# Patient Record
Sex: FEMALE | Race: BLACK OR AFRICAN AMERICAN | NOT HISPANIC OR LATINO | Employment: FULL TIME | URBAN - METROPOLITAN AREA
[De-identification: names, ages, dates, MRNs, and addresses within clinical notes are randomized per-mention and may not be internally consistent; named-entity substitution may affect disease eponyms.]

---

## 2023-03-27 PROBLEM — N95.0 POSTMENOPAUSAL BLEEDING: Status: ACTIVE | Noted: 2023-03-27

## 2023-03-27 PROBLEM — I10 ESSENTIAL HYPERTENSION: Status: ACTIVE | Noted: 2023-03-27

## 2023-03-27 PROBLEM — D64.9 ANEMIA: Status: ACTIVE | Noted: 2023-03-27

## 2023-03-27 PROBLEM — R73.9 HYPERGLYCEMIA: Status: ACTIVE | Noted: 2023-03-27

## 2023-03-27 PROBLEM — J45.909 ASTHMA: Status: ACTIVE | Noted: 2023-03-27

## 2023-03-27 PROBLEM — E03.9 ACQUIRED HYPOTHYROIDISM: Status: ACTIVE | Noted: 2023-03-27

## 2023-03-28 ENCOUNTER — OFFICE VISIT (OUTPATIENT)
Dept: FAMILY MEDICINE CLINIC | Facility: CLINIC | Age: 63
End: 2023-03-28
Payer: COMMERCIAL

## 2023-03-28 VITALS
DIASTOLIC BLOOD PRESSURE: 86 MMHG | WEIGHT: 293 LBS | RESPIRATION RATE: 16 BRPM | OXYGEN SATURATION: 98 % | SYSTOLIC BLOOD PRESSURE: 129 MMHG | HEART RATE: 72 BPM | BODY MASS INDEX: 47.09 KG/M2 | TEMPERATURE: 98.7 F | HEIGHT: 66 IN

## 2023-03-28 DIAGNOSIS — M17.0 BILATERAL PRIMARY OSTEOARTHRITIS OF KNEE: ICD-10-CM

## 2023-03-28 DIAGNOSIS — Z12.11 SCREENING FOR COLON CANCER: ICD-10-CM

## 2023-03-28 DIAGNOSIS — E78.2 MIXED HYPERLIPIDEMIA: ICD-10-CM

## 2023-03-28 DIAGNOSIS — Z00.00 ANNUAL PHYSICAL EXAM: Primary | ICD-10-CM

## 2023-03-28 DIAGNOSIS — Z12.31 SCREENING MAMMOGRAM, ENCOUNTER FOR: ICD-10-CM

## 2023-03-28 DIAGNOSIS — E03.9 ACQUIRED HYPOTHYROIDISM: ICD-10-CM

## 2023-03-28 DIAGNOSIS — I10 ESSENTIAL HYPERTENSION: ICD-10-CM

## 2023-03-28 DIAGNOSIS — R73.9 HYPERGLYCEMIA: ICD-10-CM

## 2023-03-28 DIAGNOSIS — D50.8 OTHER IRON DEFICIENCY ANEMIA: ICD-10-CM

## 2023-03-28 DIAGNOSIS — B35.1 ONYCHOMYCOSIS OF TOENAIL: ICD-10-CM

## 2023-03-28 PROBLEM — E66.01 MORBID (SEVERE) OBESITY DUE TO EXCESS CALORIES: Status: ACTIVE | Noted: 2023-03-28

## 2023-03-28 RX ORDER — DICLOFENAC SODIUM 75 MG/1
1 TABLET, DELAYED RELEASE ORAL EVERY 12 HOURS SCHEDULED
COMMUNITY
Start: 2023-02-24

## 2023-03-28 RX ORDER — TERBINAFINE HYDROCHLORIDE 250 MG/1
250 TABLET ORAL DAILY
Qty: 90 TABLET | Refills: 0 | Status: SHIPPED | OUTPATIENT
Start: 2023-03-28

## 2023-03-28 RX ORDER — ALBUTEROL SULFATE 90 UG/1
2 AEROSOL, METERED RESPIRATORY (INHALATION) EVERY 4 HOURS PRN
COMMUNITY

## 2023-03-28 RX ORDER — AMLODIPINE BESYLATE 2.5 MG/1
1 TABLET ORAL DAILY
COMMUNITY
Start: 2023-02-24

## 2023-03-28 NOTE — PROGRESS NOTES
"Chief Complaint  Annual Exam    Subjective    {CC  Problem List  Visit  Diagnosis   Encounters  Notes  Medications  Labs  Result Review Imaging  Media :23}     Juliane Nagy presents to [facility] [place] [facility] for Annual Exam.    History of Present Illness   Annual Exam-Postmenopausal:     Juliane Nagy 62 y.o.female presents for annual exam.     Last pap: approximate date 3/3021 and was normal  Last mammogramapproximate date several years ago and can't stand long enough to do it.  and was normal and patient does not recall results of last mammogram  The patient is not taking hormone replacement therapy.   The patient has regular exercise: yes.     This patient has ever been tested for HepC: no .   history; colonoscopy/sigmoidoscopy: cologuard 3 years ago  Her immunization are not up-to-date.  Labs results were discussed and ordered  Exercising 6 days a week for 15 minutes.   Has lost 42# in the past 6 years.     Hypertension, hyperlipidemia and hyperglycemia.  Takes prn albuterol for asthma and diclofenac for arthritis and will also use Tylenol.  Working on weight loss to be eligible for knee replacement with Dr. Lizarraga and now willing to do it.     She also has end stage osteoarthritis of her knees and needs a replacement but has to lose weight first. Not interested in medication or weight loss surgery.     Review of Systems   Constitutional:        Weight loss   Musculoskeletal: Positive for gait problem.   Skin: Positive for color change.        Left breast        Objective       Vital Signs:   /86   Pulse 72   Temp 98.7 °F (37.1 °C) (Oral)   Resp 16   Ht 166.4 cm (65.5\")   Wt (!) 155 kg (341 lb 11.2 oz)   SpO2 98%   BMI 56.00 kg/m²     Body mass index is 56 kg/m².       PHQ-9 Total Score: 0     Physical Exam  Constitutional:       General: She is not in acute distress.     Appearance: Normal appearance.   HENT:      Head: Normocephalic and atraumatic.      " Nose: Nose normal.   Eyes:      Conjunctiva/sclera: Conjunctivae normal.      Pupils: Pupils are equal, round, and reactive to light.   Cardiovascular:      Rate and Rhythm: Normal rate and regular rhythm.      Heart sounds: Normal heart sounds.   Pulmonary:      Effort: Pulmonary effort is normal.      Breath sounds: Normal breath sounds.   Abdominal:      General: Bowel sounds are normal.      Palpations: Abdomen is soft.   Musculoskeletal:         General: Deformity present.      Cervical back: Neck supple.      Comments: Decreased flexion in both knees, right to 90 and left to 110    Bilateral valgus deformity of knees   Skin:     General: Skin is warm.      Comments: Left breast with 5mm granuloma in the skin from old injury   Neurological:      General: No focal deficit present.      Mental Status: She is alert.      Gait: Gait abnormal.   Psychiatric:         Behavior: Behavior normal.          Result Review  Data Reviewed:{ Labs  Result Review  Imaging  Med Tab  Media :23}               Discussed healthy diet, exercise, adequate sleep, cancer screening, immunizations and preventative care. Annual eye exam and routine dental cleaning encouraged.        Assessment and Plan {CC Problem List  Visit Diagnosis  ROS  Review (Popup)  Health Maintenance  Quality  BestPractice  Medications  SmartSets  SnapShot Encounters  Media :23}   Diagnoses and all orders for this visit:    1. Annual physical exam (Primary)    2. Hyperglycemia  -     Hemoglobin A1c    3. Essential hypertension  -     Comprehensive Metabolic Panel  -     CBC & Differential    4. Acquired hypothyroidism  -     TSH    5. Screening mammogram, encounter for  -     Mammo Screening Digital Tomosynthesis Bilateral With CAD; Future    6. Screening for colon cancer  -     Cologuard - Stool, Per Rectum; Future    7. Bilateral primary osteoarthritis of knee  Assessment & Plan:  She is continuing to work on weight loss but would like to  consider a second opinion if the location is convenient.     Orders:  -     Ambulatory Referral to Orthopedic Surgery    8. Mixed hyperlipidemia  -     Comprehensive Metabolic Panel  -     Lipid Panel    9. Other iron deficiency anemia  Assessment & Plan:  On daily iron.      10. Onychomycosis of toenail  -     terbinafine (LamISIL) 250 MG tablet; Take 1 tablet by mouth Daily.  Dispense: 90 tablet; Refill: 0      Patient Instructions   I have ordered lab tests today.  You should receive a phone call or a [employer] message with those results.  If you have not heard from us in 7-10 days, please call the office.      You are eligible for a covid booster, Pneumonia vaccine, shingles vaccine and a tetanus booster.          No follow-ups on file.    Marian Herrera MD

## 2023-03-28 NOTE — PATIENT INSTRUCTIONS
I have ordered lab tests today.  You should receive a phone call or a [employer] message with those results.  If you have not heard from us in 7-10 days, please call the office.      You are eligible for a covid booster, Pneumonia vaccine, shingles vaccine and a tetanus booster.

## 2023-03-29 LAB
ALBUMIN SERPL-MCNC: 4.2 G/DL (ref 3.8–4.9)
ALBUMIN/GLOB SERPL: 1.5 {RATIO} (ref 1.2–2.2)
ALP SERPL-CCNC: 106 IU/L (ref 44–121)
ALT SERPL-CCNC: 14 IU/L (ref 0–32)
AST SERPL-CCNC: 23 IU/L (ref 0–40)
BASOPHILS # BLD AUTO: 0 X10E3/UL (ref 0–0.2)
BASOPHILS NFR BLD AUTO: 1 %
BILIRUB SERPL-MCNC: 0.4 MG/DL (ref 0–1.2)
BUN SERPL-MCNC: 19 MG/DL (ref 8–27)
BUN/CREAT SERPL: 18 (ref 12–28)
CALCIUM SERPL-MCNC: 9.4 MG/DL (ref 8.7–10.3)
CHLORIDE SERPL-SCNC: 108 MMOL/L (ref 96–106)
CHOLEST SERPL-MCNC: 229 MG/DL (ref 100–199)
CO2 SERPL-SCNC: 19 MMOL/L (ref 20–29)
CREAT SERPL-MCNC: 1.03 MG/DL (ref 0.57–1)
EGFRCR SERPLBLD CKD-EPI 2021: 62 ML/MIN/1.73
EOSINOPHIL # BLD AUTO: 0.1 X10E3/UL (ref 0–0.4)
EOSINOPHIL NFR BLD AUTO: 2 %
ERYTHROCYTE [DISTWIDTH] IN BLOOD BY AUTOMATED COUNT: 14.3 % (ref 11.7–15.4)
GLOBULIN SER CALC-MCNC: 2.8 G/DL (ref 1.5–4.5)
GLUCOSE SERPL-MCNC: 97 MG/DL (ref 70–99)
HBA1C MFR BLD: 6 % (ref 4.8–5.6)
HCT VFR BLD AUTO: 38.6 % (ref 34–46.6)
HDLC SERPL-MCNC: 70 MG/DL
HGB BLD-MCNC: 12.2 G/DL (ref 11.1–15.9)
IMM GRANULOCYTES # BLD AUTO: 0 X10E3/UL (ref 0–0.1)
IMM GRANULOCYTES NFR BLD AUTO: 0 %
LDLC SERPL CALC-MCNC: 146 MG/DL (ref 0–99)
LYMPHOCYTES # BLD AUTO: 1.7 X10E3/UL (ref 0.7–3.1)
LYMPHOCYTES NFR BLD AUTO: 47 %
MCH RBC QN AUTO: 23.6 PG (ref 26.6–33)
MCHC RBC AUTO-ENTMCNC: 31.6 G/DL (ref 31.5–35.7)
MCV RBC AUTO: 75 FL (ref 79–97)
MONOCYTES # BLD AUTO: 0.4 X10E3/UL (ref 0.1–0.9)
MONOCYTES NFR BLD AUTO: 11 %
NEUTROPHILS # BLD AUTO: 1.4 X10E3/UL (ref 1.4–7)
NEUTROPHILS NFR BLD AUTO: 39 %
PLATELET # BLD AUTO: 290 X10E3/UL (ref 150–450)
POTASSIUM SERPL-SCNC: 5 MMOL/L (ref 3.5–5.2)
PROT SERPL-MCNC: 7 G/DL (ref 6–8.5)
RBC # BLD AUTO: 5.16 X10E6/UL (ref 3.77–5.28)
SODIUM SERPL-SCNC: 145 MMOL/L (ref 134–144)
TRIGL SERPL-MCNC: 73 MG/DL (ref 0–149)
TSH SERPL DL<=0.005 MIU/L-ACNC: 5.72 UIU/ML (ref 0.45–4.5)
VLDLC SERPL CALC-MCNC: 13 MG/DL (ref 5–40)
WBC # BLD AUTO: 3.7 X10E3/UL (ref 3.4–10.8)

## 2023-03-29 NOTE — ASSESSMENT & PLAN NOTE
She is exercising almost daily and has lost close to 50# in the last 6 years.  She is not interested in medications for weight loss.

## 2023-03-29 NOTE — ASSESSMENT & PLAN NOTE
She is continuing to work on weight loss but would like to consider a second opinion if the location is convenient.

## 2023-04-27 RX ORDER — AMLODIPINE BESYLATE 2.5 MG/1
TABLET ORAL
Qty: 30 TABLET | Refills: 0 | Status: SHIPPED | OUTPATIENT
Start: 2023-04-27

## 2023-04-27 NOTE — TELEPHONE ENCOUNTER
Rx Refill Note  Requested Prescriptions     Pending Prescriptions Disp Refills   • amLODIPine (NORVASC) 2.5 MG tablet [Pharmacy Med Name: amLODIPine Besylate 2.5 MG Oral Tablet] 30 tablet 0     Sig: Take 1 tablet by mouth once daily      Last office visit with prescribing clinician: 3/28/2023   Next office visit with prescribing clinician: Visit date not found     Pedro Christensen CMA  04/27/23, 11:21 EDT

## 2023-06-06 ENCOUNTER — HOSPITAL ENCOUNTER (EMERGENCY)
Facility: HOSPITAL | Age: 63
Discharge: HOME | End: 2023-06-06
Attending: EMERGENCY MEDICINE | Admitting: EMERGENCY MEDICINE
Payer: COMMERCIAL

## 2023-06-06 ENCOUNTER — APPOINTMENT (OUTPATIENT)
Dept: CT IMAGING | Facility: HOSPITAL | Age: 63
End: 2023-06-06
Payer: COMMERCIAL

## 2023-06-06 VITALS
HEART RATE: 85 BPM | RESPIRATION RATE: 17 BRPM | TEMPERATURE: 98.1 F | BODY MASS INDEX: 48.82 KG/M2 | WEIGHT: 293 LBS | OXYGEN SATURATION: 98 % | SYSTOLIC BLOOD PRESSURE: 153 MMHG | DIASTOLIC BLOOD PRESSURE: 85 MMHG | HEIGHT: 65 IN

## 2023-06-06 DIAGNOSIS — R42 VERTIGO: ICD-10-CM

## 2023-06-06 DIAGNOSIS — R42 DIZZINESS: Primary | ICD-10-CM

## 2023-06-06 LAB
ANION GAP SERPL CALCULATED.3IONS-SCNC: 8 MMOL/L (ref 5–15)
BASOPHILS # BLD AUTO: 0.01 10*3/MM3 (ref 0–0.2)
BASOPHILS NFR BLD AUTO: 0.3 % (ref 0–1.5)
BUN SERPL-MCNC: 20 MG/DL (ref 8–23)
BUN/CREAT SERPL: 21.5 (ref 7–25)
CALCIUM SPEC-SCNC: 9.6 MG/DL (ref 8.6–10.5)
CHLORIDE SERPL-SCNC: 105 MMOL/L (ref 98–107)
CO2 SERPL-SCNC: 27 MMOL/L (ref 22–29)
CREAT SERPL-MCNC: 0.93 MG/DL (ref 0.57–1)
DEPRECATED RDW RBC AUTO: 42.7 FL (ref 37–54)
EGFRCR SERPLBLD CKD-EPI 2021: 70.5 ML/MIN/1.73
EOSINOPHIL # BLD AUTO: 0.08 10*3/MM3 (ref 0–0.4)
EOSINOPHIL NFR BLD AUTO: 2.4 % (ref 0.3–6.2)
ERYTHROCYTE [DISTWIDTH] IN BLOOD BY AUTOMATED COUNT: 15.4 % (ref 12.3–15.4)
GLUCOSE SERPL-MCNC: 123 MG/DL (ref 65–99)
HCT VFR BLD AUTO: 37.4 % (ref 34–46.6)
HGB BLD-MCNC: 11.7 G/DL (ref 12–15.9)
IMM GRANULOCYTES # BLD AUTO: 0.01 10*3/MM3 (ref 0–0.05)
IMM GRANULOCYTES NFR BLD AUTO: 0.3 % (ref 0–0.5)
LYMPHOCYTES # BLD AUTO: 1.27 10*3/MM3 (ref 0.7–3.1)
LYMPHOCYTES NFR BLD AUTO: 38.1 % (ref 19.6–45.3)
MCH RBC QN AUTO: 23.9 PG (ref 26.6–33)
MCHC RBC AUTO-ENTMCNC: 31.3 G/DL (ref 31.5–35.7)
MCV RBC AUTO: 76.5 FL (ref 79–97)
MONOCYTES # BLD AUTO: 0.41 10*3/MM3 (ref 0.1–0.9)
MONOCYTES NFR BLD AUTO: 12.3 % (ref 5–12)
NEUTROPHILS NFR BLD AUTO: 1.55 10*3/MM3 (ref 1.7–7)
NEUTROPHILS NFR BLD AUTO: 46.6 % (ref 42.7–76)
PLATELET # BLD AUTO: 273 10*3/MM3 (ref 140–450)
PMV BLD AUTO: 9.7 FL (ref 6–12)
POTASSIUM SERPL-SCNC: 4 MMOL/L (ref 3.5–5.2)
RBC # BLD AUTO: 4.89 10*6/MM3 (ref 3.77–5.28)
SODIUM SERPL-SCNC: 140 MMOL/L (ref 136–145)
WBC NRBC COR # BLD: 3.33 10*3/MM3 (ref 3.4–10.8)

## 2023-06-06 PROCEDURE — 70450 CT HEAD/BRAIN W/O DYE: CPT

## 2023-06-06 PROCEDURE — 99282 EMERGENCY DEPT VISIT SF MDM: CPT

## 2023-06-06 PROCEDURE — 85025 COMPLETE CBC W/AUTO DIFF WBC: CPT | Performed by: EMERGENCY MEDICINE

## 2023-06-06 PROCEDURE — 80048 BASIC METABOLIC PNL TOTAL CA: CPT | Performed by: EMERGENCY MEDICINE

## 2023-06-06 PROCEDURE — 36415 COLL VENOUS BLD VENIPUNCTURE: CPT

## 2023-06-06 RX ORDER — MECLIZINE HYDROCHLORIDE 25 MG/1
25 TABLET ORAL 3 TIMES DAILY PRN
Qty: 14 TABLET | Refills: 0 | Status: SHIPPED | OUTPATIENT
Start: 2023-06-06 | End: 2023-06-20

## 2023-06-06 NOTE — FSED PROVIDER NOTE
Subjective   History of Present Illness  Patient has had several episodes where she turns over in bed and becomes extremely dizzy, she says she opens her eyes and sees the room spinning around, she said in the past she had had some episodes where she felt lightheaded after exercising but this is different.  No chest pain or shortness of breath no nausea vomiting or diarrhea.  She said she has been anemic all her life and takes iron pills as well    Review of Systems   Constitutional:  Negative for chills and diaphoresis.   HENT:  Negative for congestion, drooling and ear discharge.    Eyes:  Negative for pain, discharge and itching.   Respiratory:  Negative for apnea, choking and chest tightness.    Cardiovascular:  Negative for chest pain and leg swelling.   Gastrointestinal:  Negative for abdominal distention, abdominal pain and anal bleeding.   Endocrine: Negative for cold intolerance, heat intolerance and polydipsia.   Genitourinary:  Negative for difficulty urinating, dyspareunia and dysuria.   Musculoskeletal:  Negative for arthralgias, back pain and gait problem.   Skin:  Negative for color change, pallor and rash.   Allergic/Immunologic: Negative for environmental allergies and food allergies.   Neurological:  Negative for dizziness, facial asymmetry and headaches.   Hematological:  Negative for adenopathy. Does not bruise/bleed easily.   Psychiatric/Behavioral:  Negative for agitation, behavioral problems, confusion and decreased concentration.    All other systems reviewed and are negative.    Past Medical History:   Diagnosis Date    Anemia     Asthma     Essential (primary) hypertension     Hyperglycemia     Hypothyroidism     Knee pain, left     Lichen sclerosus     Menorrhagia     Pain in unspecified knee     Postmenopausal bleeding     Snoring        Allergies   Allergen Reactions    Penicillins Hives       History reviewed. No pertinent surgical history.    Family History   Problem Relation Age of  Onset    Coronary artery disease Sister     Diabetes Sister        Social History     Socioeconomic History    Marital status: Single   Tobacco Use    Smoking status: Never    Smokeless tobacco: Never   Vaping Use    Vaping Use: Never used   Substance and Sexual Activity    Alcohol use: Never    Drug use: Never    Sexual activity: Defer           Objective   Physical Exam  Vitals and nursing note reviewed.   Constitutional:       General: She is not in acute distress.     Appearance: Normal appearance.   HENT:      Head: Normocephalic and atraumatic.      Nose: Nose normal.      Mouth/Throat:      Mouth: Mucous membranes are moist.   Eyes:      Extraocular Movements: Extraocular movements intact.      Pupils: Pupils are equal, round, and reactive to light.   Cardiovascular:      Rate and Rhythm: Normal rate and regular rhythm.   Pulmonary:      Effort: Pulmonary effort is normal.      Breath sounds: Normal breath sounds.   Abdominal:      General: Abdomen is flat.      Palpations: Abdomen is soft.   Musculoskeletal:      Cervical back: Normal range of motion and neck supple.   Skin:     General: Skin is warm and dry.   Neurological:      General: No focal deficit present.      Mental Status: She is alert and oriented to person, place, and time.   Psychiatric:         Mood and Affect: Mood normal.         Behavior: Behavior normal.       Procedures           ED Course  ED Course as of 06/06/23 1221   [facility]   [facility]18 Discussed all results with patient and she understands she may need to follow-up with ENT [JW]      ED Course User Index  [JW] Jericho Sinha MD                                           Medical Decision Making  Patient with transient dizziness, normal neuro exam and no nystagmus but concern for vascular cerebral causes of this so we will do a head CT, also check to make sure she does not have symptomatic anemia.  We will reassess after these results are returned and we will talk about different  options assuming these are all normal and that it is positional vertigo    Problems Addressed:  Dizziness: complicated acute illness or injury  Vertigo: complicated acute illness or injury    Amount and/or Complexity of Data Reviewed  External Data Reviewed: notes.  Labs: ordered.  Radiology: ordered.        Final diagnoses:   Dizziness   Vertigo       ED Disposition  ED Disposition       ED Disposition   Discharge    Condition   Stable    Comment   --               Marian Herrera MD  [address] [id number] [phone]    Schedule an appointment as soon as possible for a visit            Medication List        New Prescriptions      meclizine 25 MG tablet  Commonly known as: ANTIVERT  Take 1 tablet by mouth 3 (Three) Times a Day As Needed for Dizziness for up to 14 days.               Where to Get Your Medications        These medications were sent to [facility] [address] [place], [place] - [address] [facility] - [phone]  - [phone]   [address] [facility] [address]      Phone: [phone]   meclizine 25 MG tablet

## 2024-05-15 ENCOUNTER — PATIENT MESSAGE (OUTPATIENT)
Dept: FAMILY MEDICINE CLINIC | Facility: CLINIC | Age: 64
End: 2024-05-15
Payer: COMMERCIAL

## 2024-08-20 ENCOUNTER — OFFICE VISIT (OUTPATIENT)
Dept: FAMILY MEDICINE CLINIC | Facility: CLINIC | Age: 64
End: 2024-08-20
Payer: COMMERCIAL

## 2024-08-20 VITALS
OXYGEN SATURATION: 99 % | HEART RATE: 75 BPM | DIASTOLIC BLOOD PRESSURE: 82 MMHG | BODY MASS INDEX: 48.82 KG/M2 | RESPIRATION RATE: 16 BRPM | SYSTOLIC BLOOD PRESSURE: 134 MMHG | HEIGHT: 65 IN | WEIGHT: 293 LBS

## 2024-08-20 DIAGNOSIS — E03.9 ACQUIRED HYPOTHYROIDISM: ICD-10-CM

## 2024-08-20 DIAGNOSIS — G89.29 CHRONIC RIGHT SHOULDER PAIN: ICD-10-CM

## 2024-08-20 DIAGNOSIS — I10 ESSENTIAL HYPERTENSION: ICD-10-CM

## 2024-08-20 DIAGNOSIS — E66.01 MORBID (SEVERE) OBESITY DUE TO EXCESS CALORIES: ICD-10-CM

## 2024-08-20 DIAGNOSIS — R73.9 HYPERGLYCEMIA: ICD-10-CM

## 2024-08-20 DIAGNOSIS — Z00.00 ANNUAL PHYSICAL EXAM: Primary | ICD-10-CM

## 2024-08-20 DIAGNOSIS — M17.0 BILATERAL PRIMARY OSTEOARTHRITIS OF KNEE: ICD-10-CM

## 2024-08-20 DIAGNOSIS — J45.30 MILD PERSISTENT ASTHMA WITHOUT COMPLICATION: ICD-10-CM

## 2024-08-20 DIAGNOSIS — Z11.59 ENCOUNTER FOR HEPATITIS C SCREENING TEST FOR LOW RISK PATIENT: ICD-10-CM

## 2024-08-20 DIAGNOSIS — M25.511 CHRONIC RIGHT SHOULDER PAIN: ICD-10-CM

## 2024-08-20 DIAGNOSIS — Z13.220 NEED FOR LIPID SCREENING: ICD-10-CM

## 2024-08-20 PROCEDURE — 99214 OFFICE O/P EST MOD 30 MIN: CPT | Performed by: FAMILY MEDICINE

## 2024-08-20 PROCEDURE — 99396 PREV VISIT EST AGE 40-64: CPT | Performed by: FAMILY MEDICINE

## 2024-08-20 RX ORDER — ALBUTEROL SULFATE 90 UG/1
2 AEROSOL, METERED RESPIRATORY (INHALATION) EVERY 4 HOURS PRN
Qty: 24 G | Refills: 1 | Status: SHIPPED | OUTPATIENT
Start: 2024-08-20

## 2024-08-20 NOTE — PROGRESS NOTES
"Chief Complaint  Annual Exam (With pap)    Subjective    {CC  Problem List  Visit  Diagnosis   Encounters  Notes  Medications  Labs  Result Review Imaging  Media :23}     Juliane Nagy [place] [facility] [place] for Annual Exam (With pap).    History of Present Illness     She's having difficulty doing her weight training with her right arm and she's having a lot of pain from that.  Topical treatment helps.  She doesn't want to do physical therapy.  Dr. Lizarraga sees her for her knees and wants her to take the weight loss shots but she doesn't want those, she's losing weight on her own.  She's would like to get another opinion.     She still can't stand  long enough for a mammogram.    She's been working on weight loss and was 383 in 3/15/2017 and this year has lost another 25.   She's exercising more and eating more fruits and vegetables.  She's los about 70# in the past 7 years and been consistent.     She really doesn't want to do another pap.  She's not sexually active.    Hypertension controlled by exercise and weight loss.    Hyperglycemia working on weight loss with last A1C of 6 and has continued to lose.    She did a cologuard in 2020 and isn't doing another.    Review of Systems     Objective       Vital Signs:   /82   Pulse 75   Resp 16   Ht 165.1 cm (65\")   Wt (!) 143 kg (315 lb 6.4 oz)   SpO2 99%   BMI 52.49 kg/m²     Body mass index is 52.49 kg/m².       PHQ-9 Total Score: 0     Physical Exam  Constitutional:       General: She is not in acute distress.     Appearance: Normal appearance.   HENT:      Head: Normocephalic and atraumatic.      Nose: Nose normal.   Eyes:      Conjunctiva/sclera: Conjunctivae normal.      Pupils: Pupils are equal, round, and reactive to light.   Cardiovascular:      Rate and Rhythm: Normal rate and regular rhythm.      Heart sounds: Normal heart sounds.   Pulmonary:      Effort: Pulmonary effort is normal.      Breath sounds: " Normal breath sounds.   Abdominal:      General: Bowel sounds are normal.      Palpations: Abdomen is soft.   Musculoskeletal:         General: Deformity (can not fully staighten her legs and walks squatted) present.      Cervical back: Neck supple.   Skin:     General: Skin is warm.   Neurological:      General: No focal deficit present.      Mental Status: She is alert.      Gait: Gait normal.   Psychiatric:         Behavior: Behavior normal.          Result Review  Data Reviewed:{ Labs  Result Review  Imaging  Med Tab  Media :23}               Discussed healthy diet, exercise, adequate sleep, cancer screening, immunizations and preventative care. Annual eye exam and routine dental cleaning encouraged.        Assessment and Plan {CC Problem List  Visit Diagnosis  ROS  Review (Popup)  [facility] Maintenance  Quality  BestPractice  Medications  SmartSets  SnapShot Encounters  Media :23}   Diagnoses and all orders for this visit:    1. Annual physical exam (Primary)    2. Encounter for hepatitis C screening test for low risk patient  -     Hepatitis C Antibody    3. Bilateral primary osteoarthritis of knee  -     Ambulatory Referral to Orthopedic Surgery    4. Chronic right shoulder pain  -     Ambulatory Referral to Orthopedic Surgery    5. Hyperglycemia  -     Hemoglobin A1c    6. Morbid (severe) obesity due to excess calories    7. Acquired hypothyroidism  Assessment & Plan:  No longer needing medication    Orders:  -     TSH    8. Essential hypertension  Assessment & Plan:  Controlled with diet and exercise    Orders:  -     CBC & Differential    9. Mild persistent asthma without complication  -     albuterol sulfate  (90 Base) MCG/ACT inhaler; Inhale 2 puffs Every 4 (Four) Hours As Needed for Wheezing.  Dispense: 24 g; Refill: 1    10. Need for lipid screening  -     Comprehensive Metabolic Panel  -     Lipid Panel      Class 3 Severe Obesity (BMI >=40). Obesity-related health conditions  include the following: osteoarthritis. Obesity is improving with lifestyle modifications. BMI is is above average; BMI management plan is completed. We discussed portion control and increasing exercise.       Patient Instructions   We agreed on a pap in 2026 and to be done then.      Keep up the good work with your weight loss, you have making good progress.     We talked about your right shoulder and physical therapy or an orthopedic consult is an option if and when you're ready.    Stay off blood pressure medication, you've gotten it down with exercise and weight loss.     [phone] is the number at [facility] and if you can't get the information you need, I can try to put the standing concerns in the order.     You declined a Cologuard this time and will call if  you change your mind.     We did talk about weight loss shots and because you are doing such a good job with your weight loss already it may help facilitate continued success and then you can still maintain because you know how to do it now.  Let me know if you want to try that.        Return in about 1 year (around 8/20/2025).    Marian Herrera MD

## 2024-08-20 NOTE — PATIENT INSTRUCTIONS
We agreed on a pap in 2026 and to be done then.      Keep up the good work with your weight loss, you have making good progress.     We talked about your right shoulder and physical therapy or an orthopedic consult is an option if and when you're ready.    Stay off blood pressure medication, you've gotten it down with exercise and weight loss.     [phone] is the number at [facility] and if you can't get the information you need, I can try to put the standing concerns in the order.     You declined a Cologuard this time and will call if  you change your mind.     We did talk about weight loss shots and because you are doing such a good job with your weight loss already it may help facilitate continued success and then you can still maintain because you know how to do it now.  Let me know if you want to try that.

## 2024-08-21 ENCOUNTER — PATIENT MESSAGE (OUTPATIENT)
Dept: FAMILY MEDICINE CLINIC | Facility: CLINIC | Age: 64
End: 2024-08-21
Payer: COMMERCIAL

## 2024-08-21 LAB
ALBUMIN SERPL-MCNC: 4.3 G/DL (ref 3.5–5.2)
ALBUMIN/GLOB SERPL: 1.5 G/DL
ALP SERPL-CCNC: 120 U/L (ref 39–117)
ALT SERPL-CCNC: 14 U/L (ref 1–33)
AST SERPL-CCNC: 21 U/L (ref 1–32)
BASOPHILS # BLD AUTO: 0.03 10*3/MM3 (ref 0–0.2)
BASOPHILS NFR BLD AUTO: 0.7 % (ref 0–1.5)
BILIRUB SERPL-MCNC: 0.6 MG/DL (ref 0–1.2)
BUN SERPL-MCNC: 16 MG/DL (ref 8–23)
BUN/CREAT SERPL: 14.3 (ref 7–25)
CALCIUM SERPL-MCNC: 9.7 MG/DL (ref 8.6–10.5)
CHLORIDE SERPL-SCNC: 106 MMOL/L (ref 98–107)
CHOLEST SERPL-MCNC: 218 MG/DL (ref 0–200)
CO2 SERPL-SCNC: 24.4 MMOL/L (ref 22–29)
CREAT SERPL-MCNC: 1.12 MG/DL (ref 0.57–1)
EGFRCR SERPLBLD CKD-EPI 2021: 56.1 ML/MIN/1.73
EOSINOPHIL # BLD AUTO: 0.11 10*3/MM3 (ref 0–0.4)
EOSINOPHIL NFR BLD AUTO: 2.5 % (ref 0.3–6.2)
ERYTHROCYTE [DISTWIDTH] IN BLOOD BY AUTOMATED COUNT: 14.1 % (ref 12.3–15.4)
GLOBULIN SER CALC-MCNC: 2.8 GM/DL
GLUCOSE SERPL-MCNC: 95 MG/DL (ref 65–99)
HBA1C MFR BLD: 5.8 % (ref 4.8–5.6)
HCT VFR BLD AUTO: 39.8 % (ref 34–46.6)
HCV IGG SERPL QL IA: NON REACTIVE
HDLC SERPL-MCNC: 76 MG/DL (ref 40–60)
HGB BLD-MCNC: 12 G/DL (ref 12–15.9)
IMM GRANULOCYTES # BLD AUTO: 0.01 10*3/MM3 (ref 0–0.05)
IMM GRANULOCYTES NFR BLD AUTO: 0.2 % (ref 0–0.5)
LDLC SERPL CALC-MCNC: 130 MG/DL (ref 0–100)
LYMPHOCYTES # BLD AUTO: 2.18 10*3/MM3 (ref 0.7–3.1)
LYMPHOCYTES NFR BLD AUTO: 49.7 % (ref 19.6–45.3)
MCH RBC QN AUTO: 23.6 PG (ref 26.6–33)
MCHC RBC AUTO-ENTMCNC: 30.2 G/DL (ref 31.5–35.7)
MCV RBC AUTO: 78.3 FL (ref 79–97)
MONOCYTES # BLD AUTO: 0.45 10*3/MM3 (ref 0.1–0.9)
MONOCYTES NFR BLD AUTO: 10.3 % (ref 5–12)
NEUTROPHILS # BLD AUTO: 1.61 10*3/MM3 (ref 1.7–7)
NEUTROPHILS NFR BLD AUTO: 36.6 % (ref 42.7–76)
NRBC BLD AUTO-RTO: 0 /100 WBC (ref 0–0.2)
PLATELET # BLD AUTO: 267 10*3/MM3 (ref 140–450)
POTASSIUM SERPL-SCNC: 4.4 MMOL/L (ref 3.5–5.2)
PROT SERPL-MCNC: 7.1 G/DL (ref 6–8.5)
RBC # BLD AUTO: 5.08 10*6/MM3 (ref 3.77–5.28)
SODIUM SERPL-SCNC: 142 MMOL/L (ref 136–145)
TRIGL SERPL-MCNC: 71 MG/DL (ref 0–150)
TSH SERPL DL<=0.005 MIU/L-ACNC: 5.75 UIU/ML (ref 0.27–4.2)
VLDLC SERPL CALC-MCNC: 12 MG/DL (ref 5–40)
WBC # BLD AUTO: 4.39 10*3/MM3 (ref 3.4–10.8)

## 2024-08-23 NOTE — TELEPHONE ENCOUNTER
From: Juliette Stevenson  To: Marian Herrera  Sent: 8/21/2024 4:27 PM EDT  Subject: WORK ACCOMMODATION FORM FOR JULIETTE STEVENSON     Dr. Lin,     The orthopedic doctor refused to fill out this form because I am not a candidate for knee replacement at this time. He said there are other medical considerations for me not being able to walk as fast as a person without bent knees.    My knees being bent is only because of arthritis.     Please fill out the for with only that consideration, as I need an accommodation for extended breaks. The accommodation team will determine the minutes I could possibly use.    Please send to the email or fax to the addreses/fax nume at the end of the form when completed.    #1 The only physical condition is severe arthritis that has caused permanent knee deformity.    #4 only limitation is walking/stairs, and there are no aspects I cannot perform with a reasonable accommodation.    #7,8 NA    I hope you can help with this.    Thank You!        Thanks

## 2025-05-13 ENCOUNTER — PATIENT MESSAGE (OUTPATIENT)
Dept: FAMILY MEDICINE CLINIC | Facility: CLINIC | Age: 65
End: 2025-05-13
Payer: COMMERCIAL

## 2025-05-28 RX ORDER — DICLOFENAC SODIUM 75 MG/1
75 TABLET, DELAYED RELEASE ORAL EVERY 12 HOURS SCHEDULED
Qty: 180 TABLET | Refills: 1 | Status: SHIPPED | OUTPATIENT
Start: 2025-05-28

## 2025-07-25 ENCOUNTER — OFFICE VISIT (OUTPATIENT)
Dept: FAMILY MEDICINE CLINIC | Facility: CLINIC | Age: 65
End: 2025-07-25
Payer: COMMERCIAL

## 2025-07-25 VITALS
HEART RATE: 94 BPM | HEIGHT: 65 IN | DIASTOLIC BLOOD PRESSURE: 78 MMHG | SYSTOLIC BLOOD PRESSURE: 129 MMHG | WEIGHT: 293 LBS | OXYGEN SATURATION: 96 % | BODY MASS INDEX: 48.82 KG/M2

## 2025-07-25 DIAGNOSIS — M17.0 BILATERAL PRIMARY OSTEOARTHRITIS OF KNEE: ICD-10-CM

## 2025-07-25 DIAGNOSIS — I10 ESSENTIAL HYPERTENSION: ICD-10-CM

## 2025-07-25 DIAGNOSIS — Z00.00 ANNUAL PHYSICAL EXAM: Primary | ICD-10-CM

## 2025-07-25 DIAGNOSIS — E66.01 MORBID (SEVERE) OBESITY DUE TO EXCESS CALORIES: ICD-10-CM

## 2025-07-25 DIAGNOSIS — Z13.220 NEED FOR LIPID SCREENING: ICD-10-CM

## 2025-07-25 DIAGNOSIS — E03.9 ACQUIRED HYPOTHYROIDISM: ICD-10-CM

## 2025-07-25 DIAGNOSIS — R42 VERTIGO: ICD-10-CM

## 2025-07-25 DIAGNOSIS — J45.30 MILD PERSISTENT ASTHMA WITHOUT COMPLICATION: ICD-10-CM

## 2025-07-25 RX ORDER — DICLOFENAC SODIUM 75 MG/1
75 TABLET, DELAYED RELEASE ORAL EVERY 12 HOURS SCHEDULED
Qty: 180 TABLET | Refills: 1 | Status: SHIPPED | OUTPATIENT
Start: 2025-07-25

## 2025-07-25 NOTE — PATIENT INSTRUCTIONS
If your kidney function is affected by the diclofenac, that will be a good reason to see an orthopedist again.  For right now, we are staying on our current course.  I did renew the diclofenac hoping for normal labs but we did discuss the risk.     Your consistency with weight loss is impressive and you've done excellent work, keep it up.     I have ordered lab tests today.  You should receive a phone call or a [employer] message with those results.  If you have not heard from us in 7-10 days, please call the office.      You declined colonoscopy, mammogram, as well as vaccines (covid, pneumonia, tetanus and shingles) but if you change your mind, let me know.

## 2025-07-25 NOTE — PROGRESS NOTES
Chief Complaint  Annual Exam    Subjective    {CC  Problem List  Visit  Diagnosis   Encounters  Notes  Medications  Labs  Result Review Imaging  Media :23}     Juliane Nagy [facility] to [facility] for Annual Exam.    History of Present Illness     Annual Exam    Last pap smear: 3/30/2021  Last mammogram: 10/26/2015 and she declines  DEXA: declines for now  Last colonoscopy: Cologuard done in 2020 and declines for now  Ever screened for Hepatitis C: yes  Vaccines: TDaP, Pneumonia, Singles and Covid but declines all  Exercise: strength training, core and cardio.  She has lost 83 pounds since 2017 and been consistent with her weight loss.   Smoking status: never  Alcohol use: thinks she is allergic - burning pain in leg with one wine cooler    Single.  No kids.  Works in a [employer] center and works from home two days a week and goes in 3 days a week.     Last year she was having issues with her right shoulder and she's much better .  Her knees are still a big problem.  She had been recommended to lose weight before a knee replacement.  She can't straighten her legs fully.  She states it doesn't limit her too much but she has to sit to do any gardening and she's overall content to be home.  She does have a pain and it's bad when she get's home from work but then the next morning she's back to baseline.   Her legs have not been straight since late adolescence and has gradually worsened.   She did received a call for an ortho consult while at work and had to answer a call last year.  She is okay to stay on her current path for now.  She is pretty consistent with diclofenac.    Marginal kidney function last year and on diclofenac.      Marginal thyroid and is considering treatment but not required.     She had a two week course of vertigo between visits and it has not recurred.  The room would spin with rolling over.       Review of Systems     Objective       Vital Signs:   /78   " Pulse 94   Ht 165.1 cm (65\")   Wt (!) 136 kg (300 lb 8 oz)   SpO2 96%   BMI 50.01 kg/m²     Body mass index is 50.01 kg/m².      PHQ-9 Total Score:      Physical Exam  Constitutional:       General: She is not in acute distress.     Appearance: Normal appearance.   HENT:      Head: Normocephalic and atraumatic.      Nose: Nose normal.   Eyes:      Conjunctiva/sclera: Conjunctivae normal.      Pupils: Pupils are equal, round, and reactive to light.   Cardiovascular:      Rate and Rhythm: Normal rate and regular rhythm.      Heart sounds: Normal heart sounds.   Pulmonary:      Effort: Pulmonary effort is normal.      Breath sounds: Normal breath sounds.   Abdominal:      General: Bowel sounds are normal.      Palpations: Abdomen is soft.      Comments: Examined while seated, climbing onto the exam table was too painful   Musculoskeletal:      Cervical back: Neck supple.      Comments: Legs only straighten to about 120 degrees and she uses a cane to ambulate   Skin:     General: Skin is warm.   Neurological:      General: No focal deficit present.      Mental Status: She is alert.      Gait: Gait normal.   Psychiatric:         Behavior: Behavior normal.          Result Review  Data Reviewed:{ Labs  Result Review  Imaging  Med Tab  Media :23}               Discussed healthy diet, exercise, adequate sleep, cancer screening, immunizations and preventative care. Annual eye exam and routine dental cleaning encouraged.        Assessment and Plan {CC Problem List  Visit Diagnosis  ROS  Review (Popup)  Health Maintenance  Quality  BestPractice  Medications  SmartSets  SnapShot Encounters  Media :23}   Diagnoses and all orders for this visit:    1. Annual physical exam (Primary)    2. Acquired hypothyroidism  -     TSH    3. Morbid (severe) obesity due to excess calories  Assessment & Plan:  Being extremely consistent for 8 years with 83# of loss      4. Essential hypertension  Assessment & " Plan:  Controlled by diet and exercise.      5. Bilateral primary osteoarthritis of knee  -     diclofenac (VOLTAREN) 75 MG EC tablet; Take 1 tablet by mouth Every 12 (Twelve) Hours.  Dispense: 180 tablet; Refill: 1    6. Mild persistent asthma without complication    7. Vertigo  -     CBC & Differential  -     Vitamin B12    8. Need for lipid screening  -     Comprehensive Metabolic Panel  -     Lipid Panel              Patient Instructions   If your kidney function is affected by the diclofenac, that will be a good reason to see an orthopedist again.  For right now, we are staying on our current course.  I did renew the diclofenac hoping for normal labs but we did discuss the risk.     Your consistency with weight loss is impressive and you've done excellent work, keep it up.     I have ordered lab tests today.  You should receive a phone call or a [employer] message with those results.  If you have not heard from us in 7-10 days, please call the office.      You declined colonoscopy, mammogram, as well as vaccines (covid, pneumonia, tetanus and shingles) but if you change your mind, let me know.        No follow-ups on file.    Marian Herrera MD

## 2025-07-26 LAB
ALBUMIN SERPL-MCNC: 4.3 G/DL (ref 3.5–5.2)
ALBUMIN/GLOB SERPL: 1.4 G/DL
ALP SERPL-CCNC: 118 U/L (ref 39–117)
ALT SERPL-CCNC: 13 U/L (ref 1–33)
AST SERPL-CCNC: 22 U/L (ref 1–32)
BASOPHILS # BLD AUTO: 0.02 10*3/MM3 (ref 0–0.2)
BASOPHILS NFR BLD AUTO: 0.5 % (ref 0–1.5)
BILIRUB SERPL-MCNC: 0.4 MG/DL (ref 0–1.2)
BUN SERPL-MCNC: 22 MG/DL (ref 8–23)
BUN/CREAT SERPL: 21.4 (ref 7–25)
CALCIUM SERPL-MCNC: 9.4 MG/DL (ref 8.6–10.5)
CHLORIDE SERPL-SCNC: 103 MMOL/L (ref 98–107)
CHOLEST SERPL-MCNC: 212 MG/DL (ref 0–200)
CO2 SERPL-SCNC: 23.7 MMOL/L (ref 22–29)
CREAT SERPL-MCNC: 1.03 MG/DL (ref 0.57–1)
EGFRCR SERPLBLD CKD-EPI 2021: 61.6 ML/MIN/1.73
EOSINOPHIL # BLD AUTO: 0.12 10*3/MM3 (ref 0–0.4)
EOSINOPHIL NFR BLD AUTO: 2.8 % (ref 0.3–6.2)
ERYTHROCYTE [DISTWIDTH] IN BLOOD BY AUTOMATED COUNT: 13.4 % (ref 12.3–15.4)
GLOBULIN SER CALC-MCNC: 3 GM/DL
GLUCOSE SERPL-MCNC: 90 MG/DL (ref 65–99)
HCT VFR BLD AUTO: 36.3 % (ref 34–46.6)
HDLC SERPL-MCNC: 74 MG/DL (ref 40–60)
HGB BLD-MCNC: 11.4 G/DL (ref 12–15.9)
IMM GRANULOCYTES # BLD AUTO: 0 10*3/MM3 (ref 0–0.05)
IMM GRANULOCYTES NFR BLD AUTO: 0 % (ref 0–0.5)
LDLC SERPL CALC-MCNC: 127 MG/DL (ref 0–100)
LYMPHOCYTES # BLD AUTO: 2.16 10*3/MM3 (ref 0.7–3.1)
LYMPHOCYTES NFR BLD AUTO: 51.1 % (ref 19.6–45.3)
MCH RBC QN AUTO: 24.3 PG (ref 26.6–33)
MCHC RBC AUTO-ENTMCNC: 31.4 G/DL (ref 31.5–35.7)
MCV RBC AUTO: 77.4 FL (ref 79–97)
MONOCYTES # BLD AUTO: 0.56 10*3/MM3 (ref 0.1–0.9)
MONOCYTES NFR BLD AUTO: 13.2 % (ref 5–12)
NEUTROPHILS # BLD AUTO: 1.37 10*3/MM3 (ref 1.7–7)
NEUTROPHILS NFR BLD AUTO: 32.4 % (ref 42.7–76)
NRBC BLD AUTO-RTO: 0 /100 WBC (ref 0–0.2)
PLATELET # BLD AUTO: 267 10*3/MM3 (ref 140–450)
POTASSIUM SERPL-SCNC: 4.3 MMOL/L (ref 3.5–5.2)
PROT SERPL-MCNC: 7.3 G/DL (ref 6–8.5)
RBC # BLD AUTO: 4.69 10*6/MM3 (ref 3.77–5.28)
SODIUM SERPL-SCNC: 137 MMOL/L (ref 136–145)
TRIGL SERPL-MCNC: 64 MG/DL (ref 0–150)
TSH SERPL DL<=0.005 MIU/L-ACNC: 5.12 UIU/ML (ref 0.27–4.2)
VIT B12 SERPL-MCNC: 1503 PG/ML (ref 211–946)
VLDLC SERPL CALC-MCNC: 11 MG/DL (ref 5–40)
WBC # BLD AUTO: 4.23 10*3/MM3 (ref 3.4–10.8)